# Patient Record
Sex: FEMALE | NOT HISPANIC OR LATINO | Employment: UNEMPLOYED | URBAN - METROPOLITAN AREA
[De-identification: names, ages, dates, MRNs, and addresses within clinical notes are randomized per-mention and may not be internally consistent; named-entity substitution may affect disease eponyms.]

---

## 2022-05-22 ENCOUNTER — HOSPITAL ENCOUNTER (EMERGENCY)
Facility: HOSPITAL | Age: 42
Discharge: HOME | End: 2022-05-22
Attending: EMERGENCY MEDICINE

## 2022-05-22 VITALS
TEMPERATURE: 98.1 F | BODY MASS INDEX: 49.16 KG/M2 | WEIGHT: 287.92 LBS | HEIGHT: 64 IN | DIASTOLIC BLOOD PRESSURE: 88 MMHG | RESPIRATION RATE: 18 BRPM | OXYGEN SATURATION: 97 % | HEART RATE: 96 BPM | SYSTOLIC BLOOD PRESSURE: 132 MMHG

## 2022-05-22 DIAGNOSIS — Z63.8 FAMILY DISCORD: Primary | ICD-10-CM

## 2022-05-22 LAB — ETHANOL EXG-MCNC: 0 MG/DL

## 2022-05-22 PROCEDURE — 99285 EMERGENCY DEPT VISIT HI MDM: CPT

## 2022-05-22 PROCEDURE — 99284 EMERGENCY DEPT VISIT MOD MDM: CPT | Performed by: EMERGENCY MEDICINE

## 2022-05-22 PROCEDURE — 82075 ASSAY OF BREATH ETHANOL: CPT | Performed by: EMERGENCY MEDICINE

## 2022-05-22 SDOH — SOCIAL STABILITY - SOCIAL INSECURITY: OTHER SPECIFIED PROBLEMS RELATED TO PRIMARY SUPPORT GROUP: Z63.8
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
## 2022-05-22 NOTE — ED NOTES
Officer Suma Kilpatrick called this RN to inform me that childline has been submitted for both children        Annette Keller RN  05/22/22 4528
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
## 2022-05-22 NOTE — DISCHARGE INSTRUCTIONS
Follow-up with an outpatient mental health professional  Return if you have thoughts of wanting to harm yourself or anybody else
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
## 2022-05-22 NOTE — ED NOTES
This writer discussed the patients current presentation and recommended discharge plan with Dr Madison Armstrong  She agrees with the patient being discharged at this time with referrals and/or information about crisis mobal support  The patient was Instructed to follow up with their PCP   The patient was provided with referral information for:   CMP mobile crisis support information  The patient declined to complete a safety plan however a blank plan was provided for future use  In addition, the patient was instructed to call local [facility] crisis, other crisis services, 911 or to go to the nearest ER immediately if their situation changes at any time    This writer discussed discharge plans with the patient who agrees with and understands the discharge plans